# Patient Record
Sex: MALE | Race: WHITE | NOT HISPANIC OR LATINO | URBAN - METROPOLITAN AREA
[De-identification: names, ages, dates, MRNs, and addresses within clinical notes are randomized per-mention and may not be internally consistent; named-entity substitution may affect disease eponyms.]

---

## 2017-08-08 ENCOUNTER — OFFICE VISIT (OUTPATIENT)
Dept: RETAIL CLINIC | Facility: CLINIC | Age: 22
End: 2017-08-08

## 2017-08-08 DIAGNOSIS — Z02.83 ENCOUNTER FOR DRUG SCREENING: Primary | ICD-10-CM

## 2017-08-16 ENCOUNTER — OFFICE VISIT (OUTPATIENT)
Dept: RETAIL CLINIC | Facility: CLINIC | Age: 22
End: 2017-08-16

## 2017-08-16 DIAGNOSIS — Z02.83 ENCOUNTER FOR DRUG SCREENING: Primary | ICD-10-CM

## 2017-11-21 ENCOUNTER — OFFICE VISIT (OUTPATIENT)
Dept: RETAIL CLINIC | Facility: CLINIC | Age: 22
End: 2017-11-21

## 2017-11-21 VITALS
BODY MASS INDEX: 22.9 KG/M2 | HEART RATE: 98 BPM | RESPIRATION RATE: 14 BRPM | TEMPERATURE: 98 F | DIASTOLIC BLOOD PRESSURE: 82 MMHG | SYSTOLIC BLOOD PRESSURE: 120 MMHG | WEIGHT: 160 LBS | OXYGEN SATURATION: 99 % | HEIGHT: 70 IN

## 2017-11-21 DIAGNOSIS — J03.90 TONSILLITIS: Primary | ICD-10-CM

## 2017-11-21 DIAGNOSIS — H65.193 ACUTE OTITIS MEDIA WITH EFFUSION OF BOTH EARS: ICD-10-CM

## 2017-11-21 PROCEDURE — 99213 OFFICE O/P EST LOW 20 MIN: CPT | Performed by: NURSE PRACTITIONER

## 2017-11-21 RX ORDER — DEXAMETHASONE 1 MG
2 TABLET ORAL 2 TIMES DAILY WITH MEALS
Qty: 8 TABLET | Refills: 0 | Status: SHIPPED | OUTPATIENT
Start: 2017-11-21 | End: 2023-03-29

## 2017-11-21 RX ORDER — AMOXICILLIN 875 MG/1
875 TABLET, COATED ORAL 2 TIMES DAILY
Qty: 20 TABLET | Refills: 0 | Status: SHIPPED | OUTPATIENT
Start: 2017-11-21 | End: 2023-03-29

## 2017-11-21 NOTE — PATIENT INSTRUCTIONS
"Otitis Media With Effusion  Otitis media with effusion is the presence of fluid in the middle ear. This is a common problem in children, which often follows ear infections. It may be present for weeks or longer after the infection. Unlike an acute ear infection, otitis media with effusion refers only to fluid behind the ear drum and not infection. Children with repeated ear and sinus infections and allergy problems are the most likely to get otitis media with effusion.  CAUSES   The most frequent cause of the fluid buildup is dysfunction of the eustachian tubes. These are the tubes that drain fluid in the ears to the back of the nose (nasopharynx).  SYMPTOMS   · The main symptom of this condition is hearing loss. As a result, you or your child may:    Listen to the TV at a loud volume.    Not respond to questions.    Ask \"what\" often when spoken to.    Mistake or confuse one sound or word for another.  · There may be a sensation of fullness or pressure but usually not pain.  DIAGNOSIS   · Your health care provider will diagnose this condition by examining you or your child's ears.  · Your health care provider may test the pressure in you or your child's ear with a tympanometer.  · A hearing test may be conducted if the problem persists.  TREATMENT   · Treatment depends on the duration and the effects of the effusion.  · Antibiotics, decongestants, nose drops, and cortisone-type drugs (tablets or nasal spray) may not be helpful.  · Children with persistent ear effusions may have delayed language or behavioral problems. Children at risk for developmental delays in hearing, learning, and speech may require referral to a specialist earlier than children not at risk.  · You or your child's health care provider may suggest a referral to an ear, nose, and throat surgeon for treatment. The following may help restore normal hearing:    Drainage of fluid.    Placement of ear tubes (tympanostomy tubes).    Removal of adenoids " (adenoidectomy).  HOME CARE INSTRUCTIONS   · Avoid secondhand smoke.  · Infants who are  are less likely to have this condition.  · Avoid feeding infants while they are lying flat.  · Avoid known environmental allergens.  · Avoid people who are sick.  SEEK MEDICAL CARE IF:   · Hearing is not better in 3 months.  · Hearing is worse.  · Ear pain.  · Drainage from the ear.  · Dizziness.  MAKE SURE YOU:   · Understand these instructions.  · Will watch your condition.  · Will get help right away if you are not doing well or get worse.     This information is not intended to replace advice given to you by your health care provider. Make sure you discuss any questions you have with your health care provider.     Document Released: 01/25/2006 Document Revised: 01/08/2016 Document Reviewed: 07/15/2014  [employer] Interactive Patient Education ©2017 [employer]  Tonsillitis  Tonsillitis is an infection of the throat. This infection causes the tonsils to become red, tender, and puffy (swollen). Tonsils are groups of tissue at the back of your throat. If bacteria caused your infection, antibiotic medicine will be given to you. Sometimes symptoms of tonsillitis can be relieved with the use of steroid medicine. If your tonsillitis is severe and happens often, you may need to get your tonsils removed (tonsillectomy).  HOME CARE   · Rest and sleep often.  · Drink enough fluids to keep your pee (urine) clear or pale yellow.  · While your throat is sore, eat soft or liquid foods like:  ¨ Soup.  ¨ Ice cream.  ¨ Instant breakfast drinks.  · Eat frozen ice pops.  · Gargle with a warm or cold liquid to help soothe the throat. Gargle with a water and salt mix. Mix 1/4 teaspoon of salt and 1/4 teaspoon of baking soda in 1 cup of water.  · Only take medicines as told by your doctor.  · If you are given medicines (antibiotics), take them as told. Finish them even if you start to feel better.  GET HELP IF:  · You have large, tender  lumps in your neck.  · You have a rash.  · You cough up green, yellow-brown, or bloody fluid.  · You cannot swallow liquids or food for 24 hours.  · You notice that only one of your tonsils is swollen.  GET HELP RIGHT AWAY IF:   · You throw up (vomit).  · You have a very bad headache.  · You have a stiff neck.  · You have chest pain.  · You have trouble breathing or swallowing.  · You have bad throat pain, drooling, or your voice changes.  · You have bad pain not helped by medicine.  · You cannot fully open your mouth.  · You have redness, puffiness, or bad pain in the neck.  · You have a fever.  MAKE SURE YOU:   · Understand these instructions.  · Will watch your condition.  · Will get help right away if you are not doing well or get worse.     This information is not intended to replace advice given to you by your health care provider. Make sure you discuss any questions you have with your health care provider.     Document Released: 06/05/2009 Document Revised: 12/23/2014 Document Reviewed: 06/06/2014  [employer] Interactive Patient Education ©2017 [employer]    F/U with family health care provider in 48-72 hours if worse or new symptoms. Salt and soda gargles recommended. PCP provider sheet given.

## 2017-11-21 NOTE — PROGRESS NOTES
Subjective   Sore Throat    Prakash Loo is a 22 y.o. male who presents with sore throat.      Sore Throat    This is a new problem. The current episode started in the past 7 days. The problem has been gradually worsening. The pain is at a severity of 4/10. Associated symptoms include swollen glands and trouble swallowing (painful). Pertinent negatives include no abdominal pain, congestion, coughing, ear discharge, ear pain, headaches, neck pain or vomiting. He has tried NSAIDs for the symptoms. The treatment provided moderate relief.        History Obtained from: Patient    Past Medical History:   Diagnosis Date   • Strep pharyngitis      History reviewed. No pertinent surgical history.  Social History     Social History   • Marital status: Unknown     Spouse name: N/A   • Number of children: N/A   • Years of education: N/A     Occupational History   • Not on file.     Social History Main Topics   • Smoking status: Never Smoker   • Smokeless tobacco: Never Used   • Alcohol use 8.4 oz/week     14 Cans of beer per week   • Drug use: Defer   • Sexual activity: Defer     Other Topics Concern   • Not on file     Social History Narrative   • No narrative on file     Family History   Problem Relation Age of Onset   • No Known Problems Mother    • No Known Problems Father    • Cancer Paternal Grandfather      Allergies   Allergen Reactions   • Sulfa Antibiotics      Current Outpatient Prescriptions   Medication Sig Dispense Refill   • amoxicillin (AMOXIL) 875 MG tablet Take 1 tablet by mouth 2 (Two) Times a Day. 20 tablet 0   • dexamethasone (DECADRON) 1 MG tablet Take 2 tablets by mouth 2 (Two) Times a Day With Meals. 8 tablet 0     No current facility-administered medications for this visit.         The following portions of the patient's history were reviewed and updated as appropriate: allergies, current medications, past family history, past medical history, past social history and past surgical history.    Review  "of Systems   Constitutional: Positive for chills. Fever: unsure.   HENT: Positive for sore throat and trouble swallowing (painful). Negative for congestion, ear discharge, ear pain, postnasal drip and sinus pain.    Eyes: Negative for photophobia and visual disturbance.   Respiratory: Negative for cough and wheezing.    Cardiovascular: Negative.    Gastrointestinal: Negative for abdominal pain, nausea and vomiting.   Musculoskeletal: Negative for neck pain and neck stiffness.   Skin: Negative for rash.   Neurological: Negative for dizziness and headaches.   Hematological: Positive for adenopathy (tender).   Psychiatric/Behavioral: Negative.        Objective     VITAL SIGNS:   Vitals:    11/21/17 0837   BP: 120/82   Pulse: 98   Resp: 14   Temp: 98 °F (36.7 °C)   TempSrc: Oral   SpO2: 99%   Weight: 160 lb (72.6 kg)   Height: 70\" (177.8 cm)   PainSc:   4   PainLoc: Throat   Body mass index is 22.96 kg/(m^2).    Physical Exam   Constitutional: He appears well-developed and well-nourished.  Non-toxic appearance. No distress.   HENT:   Head: Normocephalic and atraumatic.   Right Ear: External ear and ear canal normal. Tympanic membrane is erythematous. Tympanic membrane is not perforated.   Left Ear: Ear canal normal. Tympanic membrane is erythematous and bulging. Tympanic membrane is not perforated.   Nose: Nose normal. Right sinus exhibits no maxillary sinus tenderness and no frontal sinus tenderness. Left sinus exhibits no maxillary sinus tenderness and no frontal sinus tenderness.   Mouth/Throat: Uvula swelling (mild) present. Posterior oropharyngeal edema and posterior oropharyngeal erythema present. No tonsillar abscesses. Tonsils are 3+ on the right. Tonsils are 2+ on the left.   Eyes: Conjunctivae and EOM are normal. Pupils are equal, round, and reactive to light. No scleral icterus.   Neck: Phonation normal. Neck supple. No tracheal deviation present.   Cardiovascular: Normal rate and regular rhythm.    No murmur " heard.  Pulmonary/Chest: Effort normal and breath sounds normal.   Musculoskeletal: He exhibits no deformity.   Lymphadenopathy:        Head (right side): Posterior auricular adenopathy present.        Head (left side): Posterior auricular adenopathy present.     He has cervical adenopathy (tender, right anterior cervical).     He has no axillary adenopathy.        Right: No supraclavicular adenopathy present.        Left: No supraclavicular adenopathy present.   Neurological: He is alert. Gait normal.   Skin: Skin is warm. No pallor.   Psychiatric: He has a normal mood and affect. His behavior is normal. He is attentive.       LABS: No results found for this or any previous visit.    CLINICAL QUALITY MEASURES:  Tobacco Screening & Intervention Screened & identified as tobacco non-user. Never smoker   WEIGHT SCREENING/BMI  Calculated BMI within normal parameteres & documented     Assessment/Plan     Prakash was seen today for sore throat.    Diagnoses and all orders for this visit:    Tonsillitis    Acute otitis media with effusion of both ears    Other orders  -     amoxicillin (AMOXIL) 875 MG tablet; Take 1 tablet by mouth 2 (Two) Times a Day.  -     dexamethasone (DECADRON) 1 MG tablet; Take 2 tablets by mouth 2 (Two) Times a Day With Meals.      PLAN:  Patient should follow up with primary care provider if symptoms fail to improve, worsen or for the development of new symptoms that need attention.    The patient voiced understanding and agreement to the patient treatment plan and instructions       KATHRYN García

## 2023-03-29 ENCOUNTER — TELEMEDICINE (OUTPATIENT)
Dept: SLEEP MEDICINE | Facility: CLINIC | Age: 28
End: 2023-03-29
Payer: COMMERCIAL

## 2023-03-29 DIAGNOSIS — R06.83 SNORING: ICD-10-CM

## 2023-03-29 DIAGNOSIS — G47.33 OSA (OBSTRUCTIVE SLEEP APNEA): Primary | ICD-10-CM

## 2023-03-29 PROCEDURE — 99203 OFFICE O/P NEW LOW 30 MIN: CPT | Performed by: NURSE PRACTITIONER

## 2023-03-29 NOTE — PROGRESS NOTES
[facility] Initial Evaluation    CHIEF COMPLAINT    snoring    HISTORY OF PRESENT ILLNESS    Prakash Loo is a 27 y.o.male here today for an initial evaluation of his sleeping problem.  He has had difficulty with his sleep for the last couple of years.  He does have loud snoring and witnessed apnea.  He also wakes up with a dry mouth and has nightmares.  He does not feel well rested.    He typically goes to bed around 3 AM and gets up around 1 PM.  He works second shift.  He typically does not use any medications to help him fall asleep.  He falls asleep somewhat quickly when he lays down.  He typically tries to get anywhere from 8 to 9 hours of sleep per night.  He does have loud snoring and witnessed apnea events.  He does not feel well rested in the mornings.    He works as a  and is trying to find a dayshift job.  He currently works second shift.    His weight has stayed stable over the years.  He is a non-smoker.  He does not drink alcohol or do illegal drugs.  He drinks no caffeine.    He denies any seasonal allergies.  He uses magnesium and melatonin to help him go to sleep.    He denies having a deviated septum or sinus surgery in the past.  He does not have headaches.  He does have a history of high blood pressure.  He denies any family history of TARYN.  He states his mom does snore loudly.    He denies chest pain or palpitations.  He denies any lower extremity edema or calf tenderness.  He denies any reflux symptoms.    His Ceres Sleepiness Scale is 0/24  Patient Active Problem List   Diagnosis   • Snoring       Allergies   Allergen Reactions   • Sulfa Antibiotics        Current Outpatient Medications:   •  amoxicillin (AMOXIL) 875 MG tablet, Take 1 tablet by mouth 2 (Two) Times a Day., Disp: 20 tablet, Rfl: 0  •  dexamethasone (DECADRON) 1 MG tablet, Take 2 tablets by mouth 2 (Two) Times a Day With Meals., Disp: 8 tablet, Rfl: 0  MEDICATION LIST AND ALLERGIES  REVIEWED.    Social History     Tobacco Use   • Smoking status: Never   • Smokeless tobacco: Never   Substance Use Topics   • Alcohol use: Yes     Alcohol/week: 14.0 standard drinks     Types: 14 Cans of beer per week   • Drug use: Defer       FAMILY AND SOCIAL HISTORY REVIEWED.    Review of Systems   Constitutional: Negative for activity change, appetite change, fatigue, fever and unexpected weight change.   HENT: Negative for congestion, postnasal drip, rhinorrhea, sinus pressure, sore throat and voice change.    Eyes: Negative for visual disturbance.   Respiratory: Positive for apnea. Negative for cough, chest tightness, shortness of breath and wheezing.    Cardiovascular: Negative for chest pain, palpitations and leg swelling.   Gastrointestinal: Negative for abdominal distention, abdominal pain, nausea and vomiting.   Endocrine: Negative for cold intolerance and heat intolerance.   Genitourinary: Negative for difficulty urinating and urgency.   Musculoskeletal: Negative for arthralgias, back pain and neck pain.   Skin: Negative for color change and pallor.   Allergic/Immunologic: Negative for environmental allergies and food allergies.   Neurological: Negative for dizziness, syncope, weakness and light-headedness.   Hematological: Negative for adenopathy. Does not bruise/bleed easily.   Psychiatric/Behavioral: Negative for agitation and behavioral problems.   .    There were no vitals taken for this visit.    Immunization History   Administered Date(s) Administered   • COVID-19 (SANDER) 06/16/2021       Physical Exam    Unable to do physical exam due to telemedicine visit, patient was no respiratory distress throughout the entire visit.        PROBLEM LIST    Problem List Items Addressed This Visit        Sleep    Snoring   Other Visit Diagnoses     TARYN (obstructive sleep apnea)    -  Primary    Relevant Orders    Home Sleep Study            DISCUSSION  You have chosen to receive care through a telehealth  visit.  Do you consent to use a video/audio connection for your medical care today? Yes     was here for an initial evaluation of his sleeping problem.  He states that he has had difficulty with his sleep for the last couple of years.  He does have loud snoring, witnessed apnea, nonrestorative sleep and daytime somnolence.  He does meet qualifications for a home sleep study.  I have placed these orders today we will contact him with the results.    We did discuss good sleep regimen such as laying in the lateral position, going to bed at the same time every night getting up at same time every day, avoiding caffeine in the afternoons, weight loss and regular exercise.    We also discussed treatment of TARYN over the phone today including CPAP therapy, and oral appliance and surgical repair.    He will follow-up in 2 to 3 months or sooner if his symptoms worsen.  Advised him to call with any additional concerns or questions.  I will contact him with the results of his home sleep study and discussed how to proceed.    I personally spent a total of 31 minutes on patient visit today including chart review, face to face with the patient obtaining the history and physical exam, review of pertinent images and tests, counseling and discussion and/or coordination of care as described above, and documentation.  Total time excludes time spent on other separate services such as performing procedures or test interpretation, if applicable.        Valentine Zhu, APRN  03/29/202313:04 EDT  Electronically signed     Please note that portions of this note were completed with a voice recognition program.        CC: Katheryn Dai MD

## 2023-07-18 DIAGNOSIS — G47.33 OSA (OBSTRUCTIVE SLEEP APNEA): Primary | ICD-10-CM

## 2023-07-24 DIAGNOSIS — G47.33 OSA (OBSTRUCTIVE SLEEP APNEA): Primary | ICD-10-CM
